# Patient Record
Sex: MALE | Race: WHITE
[De-identification: names, ages, dates, MRNs, and addresses within clinical notes are randomized per-mention and may not be internally consistent; named-entity substitution may affect disease eponyms.]

---

## 2019-08-14 LAB
BUN BLD-MCNC: 14 MG/DL (ref 7–18)
GLUCOSE SERPLBLD-MCNC: 106 MG/DL (ref 74–106)
HCT VFR BLD CALC: 40.2 % (ref 39.6–49)
LYMPHOCYTES # SPEC AUTO: 2.5 K/UL (ref 0.7–4.9)
PMV BLD: 8.2 FL (ref 7.6–11.3)
POTASSIUM SERPL-SCNC: 3.6 MMOL/L (ref 3.5–5.1)
RBC # BLD: 4.59 M/UL (ref 4.33–5.43)

## 2019-08-14 NOTE — RAD REPORT
EXAM DESCRIPTION:  Luz Robertson (2 Views)8/14/2019 5:35 pm

 

CLINICAL HISTORY:  Preop for back mass surgery

 

COMPARISON:  None

 

FINDINGS:   The lungs appear clear of acute infiltrate. The heart is normal size

 

IMPRESSION:   No acute abnormalities displayed

## 2019-08-15 ENCOUNTER — HOSPITAL ENCOUNTER (OUTPATIENT)
Dept: HOSPITAL 97 - OR | Age: 59
Discharge: HOME | End: 2019-08-15
Attending: SURGERY
Payer: COMMERCIAL

## 2019-08-15 DIAGNOSIS — K58.9: ICD-10-CM

## 2019-08-15 DIAGNOSIS — I10: ICD-10-CM

## 2019-08-15 DIAGNOSIS — L72.3: Primary | ICD-10-CM

## 2019-08-15 PROCEDURE — 93005 ELECTROCARDIOGRAM TRACING: CPT

## 2019-08-15 PROCEDURE — 36415 COLL VENOUS BLD VENIPUNCTURE: CPT

## 2019-08-15 PROCEDURE — 88304 TISSUE EXAM BY PATHOLOGIST: CPT

## 2019-08-15 PROCEDURE — 0JB70ZZ EXCISION OF BACK SUBCUTANEOUS TISSUE AND FASCIA, OPEN APPROACH: ICD-10-PCS

## 2019-08-15 PROCEDURE — 87070 CULTURE OTHR SPECIMN AEROBIC: CPT

## 2019-08-15 PROCEDURE — 85025 COMPLETE CBC W/AUTO DIFF WBC: CPT

## 2019-08-15 PROCEDURE — 87075 CULTR BACTERIA EXCEPT BLOOD: CPT

## 2019-08-15 PROCEDURE — 11406 EXC TR-EXT B9+MARG >4.0 CM: CPT

## 2019-08-15 PROCEDURE — 87205 SMEAR GRAM STAIN: CPT

## 2019-08-15 PROCEDURE — 80048 BASIC METABOLIC PNL TOTAL CA: CPT

## 2019-08-15 PROCEDURE — 71046 X-RAY EXAM CHEST 2 VIEWS: CPT

## 2019-08-15 NOTE — EKG
Test Date:    2019-08-14               Test Time:    16:51:47

Technician:   FELICIA                                    

                                                     

MEASUREMENT RESULTS:                                       

Intervals:                                           

Rate:         63                                     

CT:           176                                    

QRSD:         96                                     

QT:           414                                    

QTc:          423                                    

Axis:                                                

P:            42                                     

CT:           176                                    

QRS:          23                                     

T:            24                                     

                                                     

INTERPRETIVE STATEMENTS:                                       

                                                     

Normal sinus rhythm

Normal ECG

No previous ECG available for comparison



Electronically Signed On 08-15-19 11:35:00 CDT by Dominick Womack

## 2019-08-15 NOTE — OP
Date of Procedure:  08/15/2019



Surgeon:  Gigi Tinoco MD



Preoperative Diagnosis:  Infected sebaceous cyst, right back.



Postoperative Diagnosis:  Infected sebaceous cyst, right back.



Procedure:  Wide excision, right back infected sebaceous cyst 6 x 4 cm to subcutaneous tissue.



Estimated Blood Loss:  Minimal.



Specimen:  Pus and cyst.



Findings:  As above.



Anesthesia:  General.



Complications:  None.



Procedure In Detail:  Patient was brought to the OR and placed in supine position.  General anesthesi
a was begun.  Patient was prepped and draped in sterile fashion in left lateral position.  Marcaine 0
.5% was infiltrated locally.  A #15 blade was used to make a 6 x 4 cm incision to excise this inflame
d sebaceous cyst with necrotic and pus oozing from the skin itself.  The skin on the top was not viab
le that is why we did such a wide excision, and all of the cyst contents were excised.  Pus was encou
ntered.  Cultures were done.  Wound was irrigated.  Bleeding was controlled with cautery and then wet
-to-dry normal saline dressing change was applied.  Patient tolerated the procedure in stable conditi
on, taken to Recovery in good general condition.



Discharge Note:  Patient will go to Day Surgery, then home when stable.



Disposition:  Home.



Condition:  Stable.



Discharge Instructions:  Resume home medications and diet.  Activity as tolerated.  No heavy lifting.
  Remove outer dressing in a.m.  Shower.  Keep wound clean and dry.  Wet-to-dry normal saline dressin
g changes daily.  Tylenol No. 3 one tablet p.o. q.4 p.r.n. pain, Cipro 500 mg p.o. q.12.  We will tea
ch the patient and family.  Follow up in my office in 2 weeks.  Call for appointment.





AS/MODL

DD:  08/15/2019 10:51:17Voice ID:  [id number]

DT:  08/15/2019 22:12:32Report ID:  [id number]